# Patient Record
(demographics unavailable — no encounter records)

---

## 2024-12-10 NOTE — HEALTH RISK ASSESSMENT
[Yes] : Yes [Monthly or less (1 pt)] : Monthly or less (1 point) [1 or 2 (0 pts)] : 1 or 2 (0 points) [Never (0 pts)] : Never (0 points) [No] : In the past 12 months have you used drugs other than those required for medical reasons? No [No falls in past year] : Patient reported no falls in the past year [Medical reason not done] : Medical reason not done [Patient reported colonoscopy was normal] : Patient reported colonoscopy was normal [Never] : Never [Audit-CScore] : 1 [de-identified] : History of depression on Cymbalta 60 mg daily for 30 years. [ColonoscopyDate] : 06/20

## 2024-12-10 NOTE — ASSESSMENT
[FreeTextEntry1] : Diabetes/obesity-he is presently on metformin  mg daily. Is not sure what his most recent A1c was. He is contemplating trying Wegovy as long as there are not any insurance coverage issues.  Hyperlipidemia-presently on Crestor but is not sure of the dose as well as Lovaza fish oil.  Chronic depression-has been on Cymbalta 60 mg daily for years and doing well with it.  Sleep apnea/CPAP  Asthma-does use Singulair 10 mg daily.  Last colonoscopy was in 2020 and that was a normal exam.  5-year follow-up was suggested.

## 2024-12-10 NOTE — PHYSICAL EXAM
[No Acute Distress] : no acute distress [No JVD] : no jugular venous distention [Clear to Auscultation] : lungs were clear to auscultation bilaterally [Regular Rhythm] : with a regular rhythm [Normal S1, S2] : normal S1 and S2 [No Carotid Bruits] : no carotid bruits [No Edema] : there was no peripheral edema [No Focal Deficits] : no focal deficits [Alert and Oriented x3] : oriented to person, place, and time [de-identified] : Central obesity

## 2024-12-10 NOTE — HISTORY OF PRESENT ILLNESS
[de-identified] : 65-year-old male presents for initial visit to establish care and to review his medical history. He has a history of prediabetes, hyperlipidemia, asthma, sleep apnea/CPAP, long-term depression and obesity. Was previously following in a cardiology office for primary care as well.  Last checkup/annual was this past July. Is contemplating starting Wegovy but there were insurance coverage issues. Has been generally well without recent illness. Last colonoscopy was in 2020. He is /male partner.  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

## 2024-12-10 NOTE — REVIEW OF SYSTEMS
[Recent Change In Weight] : ~T no recent weight change [Chest Pain] : no chest pain [Palpitations] : no palpitations [Shortness Of Breath] : no shortness of breath [Abdominal Pain] : no abdominal pain

## 2024-12-11 NOTE — PHYSICAL EXAM
[Normal Breath Sounds] : Normal breath sounds [Abdomen Tenderness] : ~T ~M No abdominal tenderness [de-identified] : NAD [de-identified] : Diastasis Recti

## 2025-02-04 NOTE — DISCUSSION/SUMMARY
[de-identified] : The patient is referred to [facility] for therapy program Ice prn Tylenol prn.  Advil prn Risk benefits and alternatives of prescribed medication(s) were discussed with the patient. Side effects were discussed including risks of GI irritation and bleeding. Questions were answered. Discontinue medication if any issues. To call me if any questions or concerns He will avoid irritating activities I discussed Euflexxa injections for his knees and gave him a pamphlet. Risks including infection, swelling, stiffness, bleeding in addition to other associated risks with joint injection, benefits and alternatives were discussed with the patient He would like to do this  Impression: Moderate osteoarthritis right knee Mild to moderate osteoarthritis left knee

## 2025-02-04 NOTE — IMAGING
[Bilateral] : knee bilaterally [de-identified] : Mild dystrophic gait  Right knee No swelling Mild medial facet and joint line tenderness Passive range of motion 0 degrees to 125 degrees Ligaments are stable Quad strength 4+/5  Right leg No swelling Calf is soft and nontender Posterior tibial pulse 2+  Left knee No swelling Mild medial facet and joint line tenderness Passive range of motion 0 degrees to 125 degrees Ligaments are stable Quad strength 4+/5  Left leg No swelling Calf is soft and nontender Posterior tibial pulse 2+ [FreeTextEntry9] : Reviewed and interpreted.  Right knee AP standing, lateral, sunrise views-moderate degenerative changes with narrowing of the medial compartment on AP standing view, spurring patellofemoral joint  Reviewed and interpreted.  Left knee AP standing, lateral, sunrise views-mild to moderate degenerative changes with narrowing of the medial compartment on AP standing view, spurring patellofemoral joint

## 2025-02-04 NOTE — HISTORY OF PRESENT ILLNESS
[Gradual] : gradual [4] : 4 [Dull/Aching] : dull/aching [Radiating] : radiating [Leisure] : leisure [Rest] : rest [Stairs] : stairs [Retired] : Work status: retired [de-identified] : The patient has had increasing pain in both his knees over the past several months.  The right knee bothers him more than the left.  He has mild pain standing and walking.  Mild to moderate pain with stairs movie sign.  Taking Tylenol and Advil prn.  Doing home exercises. [] : Post Surgical Visit: no [FreeTextEntry1] : B Knee  [FreeTextEntry7] : B Lower Legs  [de-identified] : 1/10/2024 [de-identified] : Dr. Rojas

## 2025-03-07 NOTE — PHYSICAL EXAM
[Alert] : alert [Normal Voice/Communication] : normal voice/communication [Healthy Appearing] : healthy appearing [No Acute Distress] : no acute distress [Sclera] : the sclera and conjunctiva were normal [Hearing Threshold Finger Rub Not [place]] : hearing was normal [Normal Lips/Gums] : the lips and gums were normal [Oropharynx] : the oropharynx was normal [Normal Appearance] : the appearance of the neck was normal [No Neck Mass] : no neck mass was observed [No Respiratory Distress] : no respiratory distress [No Acc Muscle Use] : no accessory muscle use [Respiration, Rhythm And Depth] : normal respiratory rhythm and effort [Auscultation Breath Sounds / Voice Sounds] : lungs were clear to auscultation bilaterally [Heart Rate And Rhythm] : heart rate was normal and rhythm regular [Normal S1, S2] : normal S1 and S2 [Murmurs] : no murmurs [Bowel Sounds] : normal bowel sounds [Abdomen Tenderness] : non-tender [No Masses] : no abdominal mass palpated [Abdomen Soft] : soft [] : no hepatosplenomegaly [Oriented To Time, Place, And Person] : oriented to person, place, and time

## 2025-03-09 NOTE — ASSESSMENT
[FreeTextEntry1] : 66yo male with chronic diarrhea  Unclear etiology of diarrhea will check colonoscopy to evaluate for colitis Will check colonoscopy with miralax gatorade prep  if he starts Mounjaro, he will hold per protocol will hold metformin per protocol

## 2025-03-09 NOTE — HISTORY OF PRESENT ILLNESS
[FreeTextEntry1] : 64yo male with altered bowel habits  I reviewed colonoscopies form 2008, 2014, 2020 Last colonoscopy with inflammatory polyp - no prior hx adenoma  Over last several months with increased loose stool Having frequent bouts of diarrhea No significant pain or bleeding Evaluation negative to date  He may be starting Mounjaro soon

## 2025-04-11 NOTE — PHYSICAL EXAM
[de-identified] : Bilateral ankle Physical Examination:  General: Alert and oriented x3.  In no acute distress.  Pleasant in nature with a normal affect.  No apparent respiratory distress.  Erythema, Warmth, Rubor: Negative Swelling: Negative  ROM: 1. Dorsiflexion: 10 degrees 2. Plantarflexion: 40 degrees 3. Inversion: 30 degrees 4. Eversion: 20 degrees 5. Subtalar: 10 degrees  Tenderness to Palpation:  1. Lateral Malleolus: Negative 2. Medial Malleolus: Negative 3. Proximal Fibular Pain: Negative 4. Heel Pain: Negative 5. Cuboid: Negative 6. Navicular: Negative 7. Tibiotalar Joint: Negative 8. Subtalar Joint: Negative 9. Posterior Recess: Negative  Tendon Pain: 1. Achilles: Positive Achilles tendon pain to palpation left ankle.  Negative Cervantes test bilaterally. 2. Peroneals: Negative 3. Posterior Tibialis: Negative 4. Tibialis Anterior: Negative  Ligament Pain: 1. ATFL: Negative 2. CFL: Negative  3. PTFL: Negative 4. Deltoid Ligaments: Negative 5. Lis Franc Ligament: Negative  Stability:  1. Anterior Drawer: Negative 2. Posterior Drawer: Negative  Strength: 5/5 TA/GS/EHL  Pulses: 2+ DP/PT Pulses  Neuro: Intact motor and sensory  Additional Test: 1. Calcaneal Squeeze Test: Negative 2. Syndesmosis Squeeze Test: Negative [de-identified] : 3 views x-rays left ankle reviewed [facility] from 8/5/2024: Heel spurs noted.  No fractures.

## 2025-04-11 NOTE — HISTORY OF PRESENT ILLNESS
[FreeTextEntry1] : 04/11/2025: PETE MAY is a 65 year old male presenting to the office for an initial evaluation of left Achilles pain.  He reports acute on chronic pain.  He had Achilles tendon gnosis on his right heel as well but that pain has resolved.  He is currently enrolled in physical therapy for his knees and is seeking a possible physical therapy prescription for both of his ankles, Achilles tendons.  No trauma.  No other complaints.

## 2025-04-11 NOTE — DISCUSSION/SUMMARY
[de-identified] : Assessment: Bilateral ankle Achilles tendinosis.   Plan: #1. Anti-inflammatories/Tylenol as needed for pain. #2. Home stretching program/physical therapy prescription given. #3. Dr. Garcia's insoles/gel heel cups. #4. Epsom Salt Baths daily. #5. Dorsal Night Splint.  Use as instructed/as directed.  #4. Follow up in 6-8 weeks as needed. #5. All questions answered.  The patient understood the treatment plan above.